# Patient Record
Sex: MALE | Race: WHITE | HISPANIC OR LATINO | Employment: PART TIME | URBAN - METROPOLITAN AREA
[De-identification: names, ages, dates, MRNs, and addresses within clinical notes are randomized per-mention and may not be internally consistent; named-entity substitution may affect disease eponyms.]

---

## 2017-02-06 ENCOUNTER — OFFICE VISIT (OUTPATIENT)
Dept: RETAIL CLINIC | Facility: CLINIC | Age: 22
End: 2017-02-06

## 2017-02-06 VITALS
OXYGEN SATURATION: 98 % | HEIGHT: 68 IN | BODY MASS INDEX: 36.74 KG/M2 | TEMPERATURE: 98.3 F | WEIGHT: 242.4 LBS | HEART RATE: 88 BPM

## 2017-02-06 DIAGNOSIS — B00.9 HERPES SIMPLEX TYPE 1 INFECTION: Primary | ICD-10-CM

## 2017-02-06 PROCEDURE — 99212 OFFICE O/P EST SF 10 MIN: CPT | Performed by: NURSE PRACTITIONER

## 2017-02-06 RX ORDER — ACYCLOVIR 200 MG/1
200 CAPSULE ORAL 3 TIMES DAILY
Qty: 21 CAPSULE | Refills: 0 | Status: SHIPPED | OUTPATIENT
Start: 2017-02-06 | End: 2017-02-13

## 2017-02-06 NOTE — PATIENT INSTRUCTIONS
Cold Sore  A cold sore (fever blister) is a skin infection caused by a certain type of germ (virus). They are small sores filled with fluid that dry up and heal within 2 weeks. Cold sores form inside of the mouth or on the lips, gums, and other parts of the body. Cold sores can be easily passed (contagious) to other people. This can happen through close personal contact, such as kissing or sharing a drinking glass.  HOME CARE  · Only take medicine as told by your doctor. Do not use aspirin.  · Use a cotton-tip swab to put creams or gels on your sores.  · Do not touch sores or pick scabs. Wash your hands often. Do not touch your eyes without washing your hands first.  · Avoid kissing, oral sex, and sharing personal items until the sores heal.  · Put an ice pack on your sores for 10-15 minutes to ease discomfort.  · Avoid hot, cold, or salty foods. Eat a soft, bland diet. Use a straw to drink if it helps lessen pain.  · Keep sores clean and dry.  · Avoid the sun and limit stress if these things cause you to have sores. Apply sunscreen on your lips if the sun causes cold sores.  GET HELP IF:  · You have a fever or lasting symptoms for more than 2-3 days.  · You have a fever and your symptoms suddenly get worse.  · You have yellow-white fluid (not clear) coming from the sores.  · You have redness that is spreading.  · You have pain or irritation in your eye.  · You get sores on your genitals.  · Your sores do not heal within 2 weeks.  · You have a tough time fighting off sickness and infections (weakened immune system).  · You get cold sores often.  MAKE SURE YOU:   · Understand these instructions.  · Will watch your condition.  · Will get help right away if you are not doing well or get worse.     This information is not intended to replace advice given to you by your health care provider. Make sure you discuss any questions you have with your health care provider.     Document Released: 06/18/2013 Document Reviewed:  06/18/2013  [employer] Interactive Patient Education ©2016 [employer]

## 2017-02-06 NOTE — PROGRESS NOTES
Subjective   Bhavin Womack is a 21 y.o. male. Presenting with blisters on upper top lip with tingling and burning X 2 days. Has noticed that it has spread to right upper lip area. Has used chapstick with no improvement in symptoms. Reports that's he has been under more stress than normal   History of Present Illness     The following portions of the patient's history were reviewed and updated as appropriate: allergies, current medications, past family history, past medical history, past social history, past surgical history and problem list.    Review of Systems   Constitutional: Negative.    HENT: Positive for sore throat.         Blister like lesion on top lip   Respiratory: Positive for cough.        Objective   Physical Exam   Constitutional: He appears well-developed.   HENT:   Head: Normocephalic.   Mouth/Throat: Uvula is midline and oropharynx is clear and moist.   Upper top lip with small papular like lesions. Slightly erythemous in color.    Vitals reviewed.      Assessment/Plan   Bhavin was seen today for mouth lesions.    Diagnoses and all orders for this visit:    Herpes simplex type 1 infection    Other orders  -     acyclovir (ZOVIRAX) 200 MG capsule; Take 1 capsule by mouth 3 (Three) Times a Day for 7 days.          Visit information reviewed with patient, including medication prescribed and patient instructions. All questions answered and given to patient/and or caregiver.     If symptoms worsen with fever >103, please seek further evaluation in the ER. Please F/U with PCP with concerns/and questions.     Sharon Lowe, APRN

## 2017-07-27 ENCOUNTER — OFFICE VISIT (OUTPATIENT)
Dept: RETAIL CLINIC | Facility: CLINIC | Age: 22
End: 2017-07-27

## 2017-07-27 VITALS — TEMPERATURE: 98.1 F | HEART RATE: 78 BPM | RESPIRATION RATE: 20 BRPM | OXYGEN SATURATION: 100 %

## 2017-07-27 DIAGNOSIS — J06.9 ACUTE URI: Primary | ICD-10-CM

## 2017-07-27 PROCEDURE — 99213 OFFICE O/P EST LOW 20 MIN: CPT | Performed by: NURSE PRACTITIONER

## 2017-07-27 RX ORDER — AZITHROMYCIN 250 MG/1
TABLET, FILM COATED ORAL
Qty: 6 TABLET | Refills: 0 | Status: SHIPPED | OUTPATIENT
Start: 2017-07-27

## 2017-07-27 NOTE — PROGRESS NOTES
Subjective   Bhavin Womack is a 22 y.o. male.     URI    This is a new problem. The current episode started in the past 7 days. The maximum temperature recorded prior to his arrival was 100.4 - 100.9 F. The fever has been present for less than 1 day. Associated symptoms include congestion, coughing, rhinorrhea, sinus pain and a sore throat. Pertinent negatives include no abdominal pain, chest pain, diarrhea, dysuria, ear pain, headaches, joint pain, joint swelling, nausea, neck pain, plugged ear sensation, rash, sneezing, swollen glands, vomiting or wheezing. He has tried acetaminophen for the symptoms. The treatment provided mild relief.        The following portions of the patient's history were reviewed and updated as appropriate: allergies, current medications, past family history, past medical history, past social history, past surgical history and problem list.    Review of Systems   Constitutional: Positive for fever. Negative for chills, diaphoresis and fatigue.   HENT: Positive for congestion, rhinorrhea and sore throat. Negative for ear pain and sneezing.    Respiratory: Positive for cough. Negative for wheezing.    Cardiovascular: Negative for chest pain.   Gastrointestinal: Negative for abdominal pain, diarrhea, nausea and vomiting.   Genitourinary: Negative for dysuria.   Musculoskeletal: Negative for joint pain and neck pain.   Skin: Negative for rash.   Neurological: Negative for headaches.   All other systems reviewed and are negative.    Pulse 78  Temp 98.1 °F (36.7 °C)  Resp 20  SpO2 100%   Objective   Physical Exam   Constitutional: He appears well-developed.   HENT:   Head: Normocephalic.   Right Ear: External ear normal. Tympanic membrane is erythematous.   Left Ear: External ear normal.   Nose: Mucosal edema and rhinorrhea present. Right sinus exhibits frontal sinus tenderness. Left sinus exhibits frontal sinus tenderness.   Mouth/Throat: Posterior oropharyngeal edema and posterior  oropharyngeal erythema present.   Eyes: Conjunctivae and EOM are normal. Pupils are equal, round, and reactive to light.   Cardiovascular: Normal rate, regular rhythm and normal heart sounds.    Pulmonary/Chest: Effort normal and breath sounds normal.   Lymphadenopathy:     He has cervical adenopathy.   Neurological: He is alert.   Psychiatric: He has a normal mood and affect.   Nursing note and vitals reviewed.       Assessment/Plan   Bhavin was seen today for uri.    Diagnoses and all orders for this visit:    Acute URI    Other orders  -     azithromycin (ZITHROMAX Z-XANDER) 250 MG tablet; Take 2 tablets the first day, then 1 tablet daily for 4 days.             Please review visit summary for additional instructions    VIRGINIA Mckinney

## 2017-07-27 NOTE — PATIENT INSTRUCTIONS
"Upper Respiratory Infection, Adult  Most upper respiratory infections (URIs) are a viral infection of the air passages leading to the lungs. A URI affects the nose, throat, and upper air passages. The most common type of URI is nasopharyngitis and is typically referred to as \"the common cold.\"  URIs run their course and usually go away on their own. Most of the time, a URI does not require medical attention, but sometimes a bacterial infection in the upper airways can follow a viral infection. This is called a secondary infection. Sinus and middle ear infections are common types of secondary upper respiratory infections.  Bacterial pneumonia can also complicate a URI. A URI can worsen asthma and chronic obstructive pulmonary disease (COPD). Sometimes, these complications can require emergency medical care and may be life threatening.   CAUSES  Almost all URIs are caused by viruses. A virus is a type of germ and can spread from one person to another.   RISKS FACTORS  You may be at risk for a URI if:   · You smoke.    · You have chronic heart or lung disease.  · You have a weakened defense (immune) system.    · You are very young or very old.    · You have nasal allergies or asthma.  · You work in crowded or poorly ventilated areas.  · You work in health care facilities or schools.  SIGNS AND SYMPTOMS   Symptoms typically develop 2-3 days after you come in contact with a cold virus. Most viral URIs last 7-10 days. However, viral URIs from the influenza virus (flu virus) can last 14-18 days and are typically more severe. Symptoms may include:   · Runny or stuffy (congested) nose.    · Sneezing.    · Cough.    · Sore throat.    · Headache.    · Fatigue.    · Fever.    · Loss of appetite.    · Pain in your forehead, behind your eyes, and over your cheekbones (sinus pain).  · Muscle aches.    DIAGNOSIS   Your health care provider may diagnose a URI by:  · Physical exam.  · Tests to check that your symptoms are not due to " another condition such as:  ¨ Strep throat.  ¨ Sinusitis.  ¨ Pneumonia.  ¨ Asthma.  TREATMENT   A URI goes away on its own with time. It cannot be cured with medicines, but medicines may be prescribed or recommended to relieve symptoms. Medicines may help:  · Reduce your fever.  · Reduce your cough.  · Relieve nasal congestion.  HOME CARE INSTRUCTIONS   · Take medicines only as directed by your health care provider.    · Gargle warm saltwater or take cough drops to comfort your throat as directed by your health care provider.  · Use a warm mist humidifier or inhale steam from a shower to increase air moisture. This may make it easier to breathe.  · Drink enough fluid to keep your urine clear or pale yellow.    · Eat soups and other clear broths and maintain good nutrition.    · Rest as needed.    · Return to work when your temperature has returned to normal or as your health care provider advises. You may need to stay home longer to avoid infecting others. You can also use a face mask and careful hand washing to prevent spread of the virus.  · Increase the usage of your inhaler if you have asthma.    · Do not use any tobacco products, including cigarettes, chewing tobacco, or electronic cigarettes. If you need help quitting, ask your health care provider.  PREVENTION   The best way to protect yourself from getting a cold is to practice good hygiene.   · Avoid oral or hand contact with people with cold symptoms.    · Wash your hands often if contact occurs.    There is no clear evidence that vitamin C, vitamin E, echinacea, or exercise reduces the chance of developing a cold. However, it is always recommended to get plenty of rest, exercise, and practice good nutrition.   SEEK MEDICAL CARE IF:   · You are getting worse rather than better.    · Your symptoms are not controlled by medicine.    · You have chills.  · You have worsening shortness of breath.  · You have brown or red mucus.  · You have yellow or brown nasal  discharge.  · You have pain in your face, especially when you bend forward.  · You have a fever.  · You have swollen neck glands.  · You have pain while swallowing.  · You have white areas in the back of your throat.  SEEK IMMEDIATE MEDICAL CARE IF:   · You have severe or persistent:    Headache.    Ear pain.    Sinus pain.    Chest pain.  · You have chronic lung disease and any of the following:    Wheezing.    Prolonged cough.    Coughing up blood.    A change in your usual mucus.  · You have a stiff neck.  · You have changes in your:    Vision.    Hearing.    Thinking.    Mood.  MAKE SURE YOU:   · Understand these instructions.  · Will watch your condition.  · Will get help right away if you are not doing well or get worse.     This information is not intended to replace advice given to you by your health care provider. Make sure you discuss any questions you have with your health care provider.     Document Released: 06/13/2002 Document Revised: 05/03/2016 Document Reviewed: 03/25/2015  [employer] Interactive Patient Education ©2017 [employer]

## 2018-05-19 ENCOUNTER — OFFICE VISIT (OUTPATIENT)
Dept: RETAIL CLINIC | Facility: CLINIC | Age: 23
End: 2018-05-19

## 2018-05-19 VITALS — HEART RATE: 68 BPM | RESPIRATION RATE: 18 BRPM | OXYGEN SATURATION: 99 % | TEMPERATURE: 97.5 F

## 2018-05-19 DIAGNOSIS — J30.1 ACUTE SEASONAL ALLERGIC RHINITIS DUE TO POLLEN: Primary | ICD-10-CM

## 2018-05-19 PROCEDURE — 99213 OFFICE O/P EST LOW 20 MIN: CPT | Performed by: NURSE PRACTITIONER

## 2018-05-19 NOTE — PROGRESS NOTES
Subjective   Bhavin Womack is a 23 y.o. male.     He reports clear rhinorrhea, sore throat, ear fullness past few days. He has not taken any OTC medications for this. He denies fever. He noted scratchy throat.          The following portions of the patient's history were reviewed and updated as appropriate: allergies, current medications, past family history, past medical history, past social history, past surgical history and problem list.    Review of Systems   Constitutional: Negative.    HENT: Positive for rhinorrhea and sore throat. Negative for congestion, ear discharge, postnasal drip and sinus pain.    Eyes: Negative.    Respiratory: Negative.    Cardiovascular: Negative.    Gastrointestinal: Negative.    Genitourinary: Negative.    Musculoskeletal: Negative.    All other systems reviewed and are negative.    Pulse 68   Temp 97.5 °F (36.4 °C)   Resp 18   SpO2 99%    Objective   Physical Exam   Constitutional: He appears well-developed and well-nourished.   HENT:   Head: Normocephalic.   Right Ear: External ear normal.   Left Ear: External ear normal.   Nose: Rhinorrhea present.   Mouth/Throat: Uvula is midline, oropharynx is clear and moist and mucous membranes are normal.   Eyes: Conjunctivae and EOM are normal. Pupils are equal, round, and reactive to light.   Neck: Normal range of motion.   Cardiovascular: Normal rate, regular rhythm and normal heart sounds.    Pulmonary/Chest: Effort normal and breath sounds normal.   Skin: Skin is warm.   Nursing note and vitals reviewed.       Assessment/Plan   Bhavin was seen today for allergies.    Diagnoses and all orders for this visit:    Acute seasonal allergic rhinitis due to pollen    This is allergies and you can try OTC /Zyrtec as directed. Please review visit summary for additional instructions             VIRGINIA Mckinney